# Patient Record
Sex: MALE | Race: WHITE | NOT HISPANIC OR LATINO | Employment: UNEMPLOYED | URBAN - NONMETROPOLITAN AREA
[De-identification: names, ages, dates, MRNs, and addresses within clinical notes are randomized per-mention and may not be internally consistent; named-entity substitution may affect disease eponyms.]

---
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
## 2025-01-28 ENCOUNTER — TELEPHONE (OUTPATIENT)
Dept: PRIMARY CARE CLINIC | Age: 20
End: 2025-01-28

## 2025-01-28 NOTE — TELEPHONE ENCOUNTER
Pt mother, Jazmin called the office and spoke with Aisha stating she need a refill on an inhaler sent in to [facility] in [place] for pt and she spoke with Dr Mandujano and was told she need to call the office. The call was then transferred to me. I informed Jazmin that he has not been seen by Dr Mandujano and he was last seen in 2020 by Dr Mejias. She then told me that she had texted Dr Mandujano and was told to call the office and they can send it in for him. I told her I was not doing that until I heard from her myself. She verbalized understanding and said ok.

## 2025-01-29 RX ORDER — ALBUTEROL SULFATE 90 UG/1
2 INHALANT RESPIRATORY (INHALATION) EVERY 4 HOURS PRN
Qty: 1 EACH | Refills: 0 | Status: SHIPPED | OUTPATIENT
Start: 2025-01-29

## 2025-01-29 NOTE — TELEPHONE ENCOUNTER
Pt mom has been informed and she said \"Oh, Ok. Can I go ahead and schedule him an appt.\" I told her yes. She said it would need to be on a Friday. I offered her 3/14/25 at 11, 1:15 & 215. She agreed to the 215 appt time.

## 2025-03-12 ENCOUNTER — TELEPHONE (OUTPATIENT)
Dept: PRIMARY CARE CLINIC | Age: 20
End: 2025-03-12

## 2025-03-12 NOTE — TELEPHONE ENCOUNTER
Spoke with patient mother to get appt rescheduled. I did let her know there aren't any available appt for next week.  I offered 4/24 and she said he is in class the times I offered. I then offered 4/25/25@ 3PM.She agreed with date and time of appt.

## 2025-03-12 NOTE — TELEPHONE ENCOUNTER
----- Message from Simi BROWNLEE sent at 3/11/2025  1:58 PM CDT -----  Regarding: [facility] Appointment Request  [facility] Appointment Request    Patient needs appointment for [facility] Appointment Type: New to provider ./ Patient requesting to reschedule his appointment on  3/14/2025 at 2:15 pm under Dr. Cameron since he will be out of town.     Patient Requested Dates(s): Nextweek Tuesday, Wednesday or Thursday   Patient Requested Time: Anytime  Provider Name: Dona Mandujano MD    Reason for Appointment Request: Established Patient - No available appointment   --------------------------------------------------------------------------------------------------------------------------    Relationship to Patient: Guardian Jazmin- Mother     Call Back Information: OK to leave message on voicemail  Preferred Call Back Number: Phone [phone]

## 2025-04-25 ENCOUNTER — OFFICE VISIT (OUTPATIENT)
Dept: PRIMARY CARE CLINIC | Age: 20
End: 2025-04-25

## 2025-04-25 VITALS
TEMPERATURE: 97.2 F | OXYGEN SATURATION: 98 % | HEIGHT: 72 IN | DIASTOLIC BLOOD PRESSURE: 82 MMHG | SYSTOLIC BLOOD PRESSURE: 142 MMHG | BODY MASS INDEX: 27.09 KG/M2 | HEART RATE: 83 BPM | WEIGHT: 200 LBS

## 2025-04-25 DIAGNOSIS — Z00.00 ANNUAL PHYSICAL EXAM: ICD-10-CM

## 2025-04-25 DIAGNOSIS — Z71.85 VACCINE COUNSELING: ICD-10-CM

## 2025-04-25 DIAGNOSIS — E66.3 OVERWEIGHT (BMI 25.0-29.9): ICD-10-CM

## 2025-04-25 DIAGNOSIS — Z91.018 TREE NUT ALLERGY: ICD-10-CM

## 2025-04-25 DIAGNOSIS — Z76.89 ESTABLISHING CARE WITH NEW DOCTOR, ENCOUNTER FOR: Primary | ICD-10-CM

## 2025-04-25 DIAGNOSIS — Z13.1 SCREENING FOR DIABETES MELLITUS: ICD-10-CM

## 2025-04-25 DIAGNOSIS — H61.21 IMPACTED CERUMEN OF RIGHT EAR: ICD-10-CM

## 2025-04-25 DIAGNOSIS — Z91.010 PEANUT ALLERGY: ICD-10-CM

## 2025-04-25 DIAGNOSIS — R03.0 WHITE COAT SYNDROME WITHOUT DIAGNOSIS OF HYPERTENSION: ICD-10-CM

## 2025-04-25 DIAGNOSIS — Z13.29 SCREENING FOR THYROID DISORDER: ICD-10-CM

## 2025-04-25 DIAGNOSIS — J45.20 MILD INTERMITTENT ASTHMA WITHOUT COMPLICATION: ICD-10-CM

## 2025-04-25 PROBLEM — L30.1 DYSHIDROTIC ECZEMA: Status: RESOLVED | Noted: 2020-11-10 | Resolved: 2025-04-25

## 2025-04-25 RX ORDER — EPINEPHRINE 0.3 MG/.3ML
0.3 INJECTION SUBCUTANEOUS ONCE
Qty: 2 EACH | Refills: 2 | Status: SHIPPED | OUTPATIENT
Start: 2025-04-25 | End: 2025-04-25

## 2025-04-25 SDOH — ECONOMIC STABILITY: FOOD INSECURITY: WITHIN THE PAST 12 MONTHS, THE FOOD YOU BOUGHT JUST DIDN'T LAST AND YOU DIDN'T HAVE MONEY TO GET MORE.: NEVER TRUE

## 2025-04-25 SDOH — ECONOMIC STABILITY: FOOD INSECURITY: WITHIN THE PAST 12 MONTHS, YOU WORRIED THAT YOUR FOOD WOULD RUN OUT BEFORE YOU GOT MONEY TO BUY MORE.: NEVER TRUE

## 2025-04-25 ASSESSMENT — ENCOUNTER SYMPTOMS
SHORTNESS OF BREATH: 0
EYE PAIN: 0
VOICE CHANGE: 0
BLOOD IN STOOL: 0
SINUS PRESSURE: 0
SORE THROAT: 0
VOMITING: 0
WHEEZING: 0
COUGH: 0
RHINORRHEA: 0
ABDOMINAL PAIN: 0
EYE DISCHARGE: 0
EYE REDNESS: 0
DIARRHEA: 0
CHEST TIGHTNESS: 0
COLOR CHANGE: 0

## 2025-04-25 ASSESSMENT — PATIENT HEALTH QUESTIONNAIRE - PHQ9
SUM OF ALL RESPONSES TO PHQ QUESTIONS 1-9: 0
1. LITTLE INTEREST OR PLEASURE IN DOING THINGS: NOT AT ALL
2. FEELING DOWN, DEPRESSED OR HOPELESS: NOT AT ALL
SUM OF ALL RESPONSES TO PHQ QUESTIONS 1-9: 0

## 2025-04-25 ASSESSMENT — VISUAL ACUITY: OU: 1

## 2025-04-25 NOTE — PROGRESS NOTES
normal.         Cognition and Memory: Cognition and memory normal.         Judgment: Judgment normal.         Lab Results   Component Value Date    WBC 5.8 11/11/2020    HGB 15.1 11/11/2020    HCT 46.6 11/11/2020    MCV 86.6 11/11/2020     11/11/2020    LYMPHOPCT 42.5 (H) 11/11/2020    RBC 5.38 11/11/2020    MCH 28.1 11/11/2020    MCHC 32.4 (L) 11/11/2020    RDW 12.5 11/11/2020     Lab Results   Component Value Date/Time     11/11/2020 10:50 AM    K 4.2 11/11/2020 10:50 AM     11/11/2020 10:50 AM    CO2 22 11/11/2020 10:50 AM    BUN 8 11/11/2020 10:50 AM    CREATININE 0.6 11/11/2020 10:50 AM    GLUCOSE 97 11/11/2020 10:50 AM    CALCIUM 9.8 11/11/2020 10:50 AM    ALKPHOS 111 11/11/2020 10:50 AM    ALT 88 11/11/2020 10:50 AM    AST 48 11/11/2020 10:50 AM     Lab Results   Component Value Date    TSH 2.290 11/11/2020    T4FREE 1.02 11/11/2020     No results found for: \"CHOL\"  No results found for: \"TRIG\"  Lab Results   Component Value Date    HDL 34 (L) 11/11/2020     Lab Results   Component Value Date     11/11/2020         No results found for this visit on 04/25/25.    Results           ICD-10-CM    1. Establishing care with new doctor, encounter for  Z76.89       2. Annual physical exam  Z00.00 CBC with Auto Differential     Comprehensive Metabolic Panel     Lipid Panel      3. Peanut allergy  Z91.010 EPINEPHrine (EPIPEN 2-CHANTAL) 0.3 MG/0.3ML SOAJ injection      4. Tree nut allergy  Z91.018 EPINEPHrine (EPIPEN 2-CHANTAL) 0.3 MG/0.3ML SOAJ injection      5. Mild intermittent asthma without complication  J45.20       6. Impacted cerumen of right ear  H61.21 carbamide peroxide (DEBROX) 6.5 % otic solution      7. Screening for thyroid disorder  Z13.29 T4, Free     TSH      8. Screening for diabetes mellitus  Z13.1 Hemoglobin A1C      9. Vaccine counseling  Z71.85       10. White coat syndrome without diagnosis of hypertension  R03.0       11. Overweight (BMI 25.0-29.9)  E66.3             Assessment